# Patient Record
Sex: FEMALE | Race: WHITE
[De-identification: names, ages, dates, MRNs, and addresses within clinical notes are randomized per-mention and may not be internally consistent; named-entity substitution may affect disease eponyms.]

---

## 2017-01-20 ENCOUNTER — HOSPITAL ENCOUNTER (OUTPATIENT)
Dept: HOSPITAL 75 - CARD | Age: 52
Discharge: HOME | End: 2017-01-20
Attending: PAIN MEDICINE
Payer: MEDICARE

## 2017-01-20 VITALS — HEIGHT: 68 IN | WEIGHT: 214 LBS | BODY MASS INDEX: 32.43 KG/M2

## 2017-01-20 VITALS — SYSTOLIC BLOOD PRESSURE: 138 MMHG | DIASTOLIC BLOOD PRESSURE: 85 MMHG

## 2017-01-20 VITALS — DIASTOLIC BLOOD PRESSURE: 100 MMHG | SYSTOLIC BLOOD PRESSURE: 136 MMHG

## 2017-01-20 DIAGNOSIS — M53.3: Primary | ICD-10-CM

## 2017-01-20 DIAGNOSIS — Z79.899: ICD-10-CM

## 2017-01-20 DIAGNOSIS — M51.16: ICD-10-CM

## 2017-01-20 PROCEDURE — 27096 INJECT SACROILIAC JOINT: CPT

## 2017-01-20 NOTE — PAIN MEDICINE-PROCEDURE
Procedure


Pre-Op/Post-Op Diagnosis


Diagnosis:  sacrococcygeal disorder


Indications for Operation


Hip pain


Attending Surgeon


Sakina





Procedure


Date of Service:  Jan 20, 2017


Procedure:  Flouroscopic guided bilateral sacroiliac joint injection





PROCEDURE IN DETAIL:  After obtaining informed consent from the patient, the 

patient's chart was reviewed.  The patient was then brought to the procedure 

room and placed in the prone position.  A time out was performed.  The back was 

prepped with antiseptic solution and under fluoroscopic guidance the patient's 

sacroiliac joint on both sides was identified.  Attention was first turned to 

the right sacroiliac joint injection where 2 mL's of 1% lidocaine was used to 

anesthetize the skin and then two  22-gauge 3.5 inch spinal needles were 

inserted and advanced under flouroscopic guidance until they were in the lower 1

/3 of the right sacroiliac joint.  Next, attention was then turned to the left 

sacroiliac joint injection where 2 mL's of 1% lidocaine was used to anesthetize 

the skin and then two 22-gauge 3.5 inch spinal needles were inserted and 

advance under flouroscopic guidance until they were in the lower 1/3 of the 

sacroiliac joint on the left side.  After negative aspiration, each needle was 

injected with 40 mg of Kenalog along with 2 mL's of 0.25% marcaine.  All 

needles were then flushed with 1% lidocaine and then removed.  





Band-Aids were applied to all the sites and the patient tolerated the procedure 

well and was taken to the recovery area in stable condition.


Complications


None








MACKENZIE KWAN MD Jan 20, 2017 2:10 pm

## 2017-05-22 ENCOUNTER — HOSPITAL ENCOUNTER (OUTPATIENT)
Dept: HOSPITAL 75 - CARD | Age: 52
Discharge: HOME | End: 2017-05-22
Attending: PAIN MEDICINE
Payer: MEDICARE

## 2017-05-22 VITALS — WEIGHT: 217 LBS | HEIGHT: 68 IN | BODY MASS INDEX: 32.89 KG/M2

## 2017-05-22 VITALS — SYSTOLIC BLOOD PRESSURE: 145 MMHG | DIASTOLIC BLOOD PRESSURE: 98 MMHG

## 2017-05-22 VITALS — DIASTOLIC BLOOD PRESSURE: 100 MMHG | SYSTOLIC BLOOD PRESSURE: 156 MMHG

## 2017-05-22 DIAGNOSIS — M53.3: Primary | ICD-10-CM

## 2017-05-22 DIAGNOSIS — M51.16: ICD-10-CM

## 2017-05-22 PROCEDURE — 27096 INJECT SACROILIAC JOINT: CPT

## 2017-05-22 NOTE — PAIN MEDICINE-PROCEDURE
Procedure


Pre-Op/Post-Op Diagnosis


Diagnosis:  sacrococcygeal disorder


Indications for Operation


Hip pain


Attending Surgeon


Sakina





Procedure


Date of Service:  May 22, 2017


Procedure:  Flouroscopic guided bilateral sacroiliac joint injection





PROCEDURE IN DETAIL:  After obtaining informed consent from the patient, the 

patient's chart was reviewed.  The patient was then brought to the procedure 

room and placed in the prone position.  A time out was performed.  The back was 

prepped with antiseptic solution and under fluoroscopic guidance the patient's 

sacroiliac joint on both sides was identified.  Attention was first turned to 

the right sacroiliac joint injection where 2 mL's of 1% lidocaine was used to 

anesthetize the skin and then two  22-gauge 3.5 inch spinal needles were 

inserted and advanced under flouroscopic guidance until they were in the lower 1

/3 of the right sacroiliac joint.  Next, attention was then turned to the left 

sacroiliac joint injection where 2 mL's of 1% lidocaine was used to anesthetize 

the skin and then two 22-gauge 3.5 inch spinal needles were inserted and 

advance under flouroscopic guidance until they were in the lower 1/3 of the 

sacroiliac joint on the left side.  After negative aspiration, each needle was 

injected with 40 mg of Kenalog along with 2 mL's of 0.25% marcaine.  All 

needles were then flushed with 1% lidocaine and then removed.  





Band-Aids were applied to all the sites and the patient tolerated the procedure 

well and was taken to the recovery area in stable condition.


Complications


None











MACKENZIE KWAN MD May 22, 2017 4:05 pm

## 2018-01-25 ENCOUNTER — HOSPITAL ENCOUNTER (OUTPATIENT)
Dept: HOSPITAL 75 - CARD | Age: 53
Discharge: HOME | End: 2018-01-25
Attending: PAIN MEDICINE
Payer: MEDICARE

## 2018-01-25 VITALS — SYSTOLIC BLOOD PRESSURE: 155 MMHG | DIASTOLIC BLOOD PRESSURE: 97 MMHG

## 2018-01-25 VITALS — BODY MASS INDEX: 32.89 KG/M2 | HEIGHT: 68 IN | WEIGHT: 217 LBS

## 2018-01-25 VITALS — SYSTOLIC BLOOD PRESSURE: 143 MMHG | DIASTOLIC BLOOD PRESSURE: 86 MMHG

## 2018-01-25 DIAGNOSIS — Z79.899: ICD-10-CM

## 2018-01-25 DIAGNOSIS — M46.1: ICD-10-CM

## 2018-01-25 DIAGNOSIS — F17.210: ICD-10-CM

## 2018-01-25 DIAGNOSIS — K21.9: ICD-10-CM

## 2018-01-25 DIAGNOSIS — Z88.2: ICD-10-CM

## 2018-01-25 DIAGNOSIS — Z88.8: ICD-10-CM

## 2018-01-25 DIAGNOSIS — F32.9: ICD-10-CM

## 2018-01-25 DIAGNOSIS — G89.4: ICD-10-CM

## 2018-01-25 DIAGNOSIS — M54.16: Primary | ICD-10-CM

## 2018-01-25 PROCEDURE — 62323 NJX INTERLAMINAR LMBR/SAC: CPT

## 2018-03-22 ENCOUNTER — HOSPITAL ENCOUNTER (OUTPATIENT)
Dept: HOSPITAL 75 - CARD | Age: 53
End: 2018-03-22
Attending: PAIN MEDICINE
Payer: MEDICARE

## 2018-03-22 VITALS — DIASTOLIC BLOOD PRESSURE: 92 MMHG | SYSTOLIC BLOOD PRESSURE: 143 MMHG

## 2018-03-22 VITALS — WEIGHT: 217 LBS | HEIGHT: 68 IN | BODY MASS INDEX: 32.89 KG/M2

## 2018-03-22 VITALS — SYSTOLIC BLOOD PRESSURE: 139 MMHG | DIASTOLIC BLOOD PRESSURE: 90 MMHG

## 2018-03-22 DIAGNOSIS — M54.16: ICD-10-CM

## 2018-03-22 DIAGNOSIS — Z79.899: ICD-10-CM

## 2018-03-22 DIAGNOSIS — G89.4: ICD-10-CM

## 2018-03-22 DIAGNOSIS — M46.1: Primary | ICD-10-CM

## 2018-03-22 PROCEDURE — 27096 INJECT SACROILIAC JOINT: CPT

## 2018-07-12 ENCOUNTER — HOSPITAL ENCOUNTER (OUTPATIENT)
Dept: HOSPITAL 75 - CARD | Age: 53
End: 2018-07-12
Attending: PAIN MEDICINE
Payer: MEDICARE

## 2018-07-12 VITALS — DIASTOLIC BLOOD PRESSURE: 85 MMHG | SYSTOLIC BLOOD PRESSURE: 138 MMHG

## 2018-07-12 VITALS — BODY MASS INDEX: 35.31 KG/M2 | HEIGHT: 68 IN | WEIGHT: 233 LBS

## 2018-07-12 VITALS — SYSTOLIC BLOOD PRESSURE: 144 MMHG | DIASTOLIC BLOOD PRESSURE: 89 MMHG

## 2018-07-12 DIAGNOSIS — M54.16: Primary | ICD-10-CM

## 2018-07-12 PROCEDURE — 62290 NJX PX DISCOGRAPHY LUMBAR: CPT

## 2018-07-12 NOTE — OPERATIVE REPORT
DATE OF SERVICE:  07/12/2018



DIAGNOSIS:

Lumbar radiculopathy.



PROCEDURE:

Fluoroscopic guided interlaminar, epidural steroid injection.

 

PROCEDURE IN DETAIL:

After obtaining informed consent from the patient, the patient's chart was

reviewed.  The patient was then brought to the procedure room and placed in the

prone position.  A timeout was performed.  The back was prepped with antiseptic

solution and under fluoro guidance, the patient's lumbar spine was identified at

the level of L5-S1.  The L5-S1 vertebra was identified with fluoro guidance and

approximately 2 mL of 1.5% lidocaine solution was used to anesthetize the skin

directly down to the pedicle of the L5-S1 and under fluoroscopic guidance, the

tract was anesthetized up to the interlaminar space and the ligamentum flavum. 

This needle was withdrawn.  Then, a 20-gauge 3.5 inch Tuohy needle was then

directed following the same tract that was anesthetized with the spinal needle. 

Using loss of resistance, the epidural space was identified and then the syringe

was switched for contrast solution which was injected, approximately 1 mL. 

After secondary confirmation of epidural access, another syringe was placed and

80 mg of Depo-Medrol was injected.  The Tuohy needle was then flushed out with

approximately 2 mL of the normal saline used from the loss of resistance

syringe.

 

Band-Aids were applied to all the procedure sites.  The patient tolerated the

procedure well and was taken to the recovery room in stable condition.





Job ID: [id number]

DocumentID: [id number]

Dictated Date:  07/12/2018 15:42:35

Transcription Date: 07/12/2018 22:34:03

Dictated By: KELLY ROBISON DO

## 2019-04-23 ENCOUNTER — HOSPITAL ENCOUNTER (OUTPATIENT)
Dept: HOSPITAL 75 - RAD | Age: 54
End: 2019-04-23
Attending: FAMILY MEDICINE
Payer: MEDICARE

## 2019-04-23 DIAGNOSIS — R07.81: ICD-10-CM

## 2019-04-23 DIAGNOSIS — S49.92XA: Primary | ICD-10-CM
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
## 2019-04-23 PROCEDURE — 73000 X-RAY EXAM OF COLLAR BONE: CPT

## 2019-04-23 PROCEDURE — 71045 X-RAY EXAM CHEST 1 VIEW: CPT

## 2019-04-23 NOTE — DIAGNOSTIC IMAGING REPORT
PA chest at 908 hours.



INDICATION: Fell.



FINDINGS: The heart size is within normal limits and stable when

compared to 06/15/2016. The lungs are clear. There is no sign of

a contusion or pneumothorax. There is no evidence for pneumonia

or pleural effusion. Mediastinum is not widened. The osseous

structures are intact.



IMPRESSION: There is no evidence for an acute cardiopulmonary

abnormality.



Dictated by: 



  Dictated on workstation # [id number]

## 2019-04-23 NOTE — DIAGNOSTIC IMAGING REPORT
Left clavicle at 909 hours.



INDICATION: Injury, clavicle pain.



2 views were obtained.



FINDINGS: There is no fracture, dislocation or acute bony

abnormality evident. The acromioclavicular joint is fairly

well-maintained and seem similar to the prior chest exam of

06/15/2016. The soft tissues are unremarkable.



IMPRESSION: There is no evidence for an acute bony abnormality.



Dictated by: 



  Dictated on workstation # [id number]

## 2020-08-11 ENCOUNTER — HOSPITAL ENCOUNTER (OUTPATIENT)
Dept: HOSPITAL 75 - RAD | Age: 55
End: 2020-08-11
Attending: FAMILY MEDICINE
Payer: MEDICARE

## 2020-08-11 DIAGNOSIS — Z12.31: Primary | ICD-10-CM

## 2020-08-11 PROCEDURE — 77067 SCR MAMMO BI INCL CAD: CPT

## 2020-08-11 PROCEDURE — 77063 BREAST TOMOSYNTHESIS BI: CPT

## 2020-08-11 NOTE — DIAGNOSTIC IMAGING REPORT
INDICATION: 

Routine screening.



COMPARISON: 

No prior mammograms are available for comparison.



TECHNIQUE: 

2D and 3D bilateral screening mammography was performed with CAD.



FINDINGS:

Both breasts are primarily involutional. No spiculated mass or

malignant appearing microcalcifications are identified. The

axillae are unremarkable.



IMPRESSION: 

No mammographic features suspicious for malignancy are

identified.



ACR BI-RADS Category 1: Negative.

Result letter will be mailed to the patient.

Note:  At least 10% of breast cancer is not imaged by

mammography.



Dictated by: 



  Dictated on workstation # [id number]

## 2020-08-17 ENCOUNTER — HOSPITAL ENCOUNTER (OUTPATIENT)
Dept: HOSPITAL 75 - RAD | Age: 55
End: 2020-08-17
Attending: FAMILY MEDICINE
Payer: MEDICARE

## 2020-08-17 DIAGNOSIS — M47.814: Primary | ICD-10-CM

## 2020-08-17 DIAGNOSIS — R07.9: ICD-10-CM

## 2020-08-17 DIAGNOSIS — R06.00: ICD-10-CM

## 2020-08-17 DIAGNOSIS — M25.512: ICD-10-CM

## 2020-08-17 PROCEDURE — 72072 X-RAY EXAM THORAC SPINE 3VWS: CPT

## 2020-08-17 PROCEDURE — 71046 X-RAY EXAM CHEST 2 VIEWS: CPT

## 2020-08-17 PROCEDURE — 73030 X-RAY EXAM OF SHOULDER: CPT

## 2020-08-17 NOTE — DIAGNOSTIC IMAGING REPORT
Indication: Left shoulder pain.



Time of exam: 1:02 PM



3 views of the left shoulder were obtained.



Glenohumeral and acromioclavicular alignment are normal.

Glenohumeral space is normal. No fracture or dislocation is seen.



IMPRESSION: No acute bony abnormality is detected.



Dictated by: 



  Dictated on workstation # [id number]

## 2020-08-17 NOTE — DIAGNOSTIC IMAGING REPORT
INDICATION: Dyspnea and shoulder pain. Patient also has back

pain.



TIME OF EXAM:  1:06 PM



Frontal and lateral views of the thoracic spine were obtained.

Curvature and alignment of the thoracic spine is normal.

Vertebral body heights are maintained. No acute compression

fracture is seen. There is generalized degenerative disc disease

with variable disc space narrowing. The pedicles and paraspinous

line are intact.



IMPRESSION: Thoracic spondylosis. No acute bony abnormality is

detected.



Dictated by: 



  Dictated on workstation # [id number]

## 2020-08-17 NOTE — DIAGNOSTIC IMAGING REPORT
Indication: Shoulder pain and dyspnea.



Time of exam: 12:59 PM



Comparison is made with prior chest from 04/23/2019.



The heart size is normal. The pulmonary vascularity is

unremarkable. The lungs are clear. No infiltrate, effusion or

pneumothorax is detected.



Impression: No acute cardiopulmonary process is detected.



Dictated by: 



  Dictated on workstation # [id number]

## 2020-09-15 ENCOUNTER — HOSPITAL ENCOUNTER (OUTPATIENT)
Dept: HOSPITAL 75 - RAD | Age: 55
End: 2020-09-15
Attending: ORTHOPAEDIC SURGERY
Payer: MEDICARE

## 2020-09-15 DIAGNOSIS — M48.02: Primary | ICD-10-CM

## 2020-09-15 DIAGNOSIS — M48.04: ICD-10-CM

## 2020-09-15 DIAGNOSIS — M47.22: ICD-10-CM

## 2020-09-15 DIAGNOSIS — M50.121: ICD-10-CM

## 2020-09-15 PROCEDURE — 72141 MRI NECK SPINE W/O DYE: CPT

## 2020-09-15 NOTE — DIAGNOSTIC IMAGING REPORT
PROCEDURE: MR imaging cervical spine without contrast.



TECHNIQUE: Multiplanar, multisequence MR imaging of the cervical

spine was performed without contrast.



INDICATION: Left arm pain and numbness.



COMPARISON: No prior studies are available for comparison.



FINDINGS: There is straightening of the normal cervical lordotic

curvature. Alignment is normal. The marrow signal intensity is

unremarkable. No geographic marrow lesion is seen. There is

generalized degenerative disc disease with variable disc space

narrowing and desiccation. The cervical cord shows normal

homogeneous signal intensity and normal morphology.

Craniocervical junction is unremarkable.



C2-C3: Central canal and neural foramina appear widely patent.



C3-C4: Central canal and neural foramina appear widely patent.



C4-C5: Disc/osteophyte complex indents the ventral thecal sac

producing mild-to-moderate central canal narrowing. There is also

moderate left and mild right neural foraminal stenosis due to

uncovertebral joint degenerative change.



C5-C6: Broad-based disc/osteophyte complex indents the ventral

thecal sac and produces mild-to-moderate narrowing of the canal.

There is also significant narrowing of the neural foramina

bilaterally due to uncovertebral joint degenerative change.



C6-C7: Broad-based disc/osteophyte complex indents the ventral

thecal sac producing some mild-to-moderate central canal

narrowing. Neural foramina appear patent.



C7-T1: Broad-based disc/osteophyte complex flattens the ventral

thecal sac and does produce mild-to-moderate narrowing of the

central canal. There is mild neural foraminal narrowing

bilaterally.



Paraspinous tissues are unremarkable.



IMPRESSION: Multilevel cervical spondylosis with multilevel

central canal or neural foraminal narrowing described level by

level above.



Dictated by: 



  Dictated on workstation # [id number]

## 2021-09-03 NOTE — XMS REPORT
CARDIOVASCULAR MEDICINE, NEW PATIENT    COVID-19 Screening:    • Does the patient OR patient’s household members have any of the following symptoms?  o Temperature: Fever ?100.0°F or ?37.8°C?  No  o Respiratory symptoms: New or worsening cough, shortness of breath, difficulty breathing, or sore throat? No  o GI symptoms: New onset of nausea, vomiting or diarrhea?  No  o Miscellaneous: New onset of loss of taste or smell, chills, repeated shaking with chills, muscle pain, headache, congestion or runny nose?  No  • Has the patient or a household member tested positive for COVID-19 in the last 14 days?  No  • Has the patient or a household member been tested for COVID-19 and are waiting for the results?  No    REASON FOR CONSULTATION:    I am seeing this patient at the request of Dr. Lance Montgomery for orthostatic hypotension.    CHIEF COMPLAINT:    Orthostatic hypotension    HISTORY OF PRESENT ILLNESS:    Giuseppe Segura is a healthy 19 y/o male, with asthma and eczema, who presents to establish care with Cardiology.     He's had a variety of symptoms, which have been present for ~4 months.    Symptoms are primarily brought on during exertion. He frequently noticing a racing heart (but hasn't actually measured HR, at these times). There's also been occasional dizziness, but never with actual LOC.    He will often notice dizziness/lightheadedness, upon going from sitting to standing. Sometimes, it's difficult to distinguish symptoms from those related to asthma. He does have an occasional chest tightness, likely attributable to asthma.    Sometimes, he's noted some low blood pressures. He wonders about the possibility of dehydration.    Symptoms were potentially at their worst over Labor Day, when he was playing basketball in some very hot weather.    He'd been completing ~1 hour of exercise, 5 times per week, with occasional pick-up games of basketball.    He presents today with his mother, Jenn, who helps to clarify  Continuity of Care Document

 Created on: 2016



PALAK DYSON

External Reference #: [id number]

: 1965

Sex: Female



Demographics







 Address  [address] [place]  [address]

 

 Home Phone  [phone]

 

 Preferred Language  English

 

 Marital Status  Unknown

 

 Tenriism Affiliation  Unknown

 

 Race  Unknown

 

 Ethnic Group  Unknown





Author







 Author  [facility]

 

 Organization  [facility]

 

 Address  Unknown

 

 Phone  Unavailable







Support







 Name  Relationship  Address  Phone

 

 NO, LOCAL PHYSICIAN  Caregiver  Unknown  Unavailable

 

 MACKENZIE KWAN MD  Caregiver  [address] [facility] [address]

[place]  [address] [phone]

 

 KELLY SON MD  Caregiver  [address] [place]  [address]  [phone]

 

 DAVID DYSON  Next Of Kin  [address] [place]  [address] [phone]







Care Team Providers







 Care Team Member Name  Role  Phone

 

 NO, LOCAL PHYSICIAN  PCP  Unavailable







Insurance Providers







 Payer Name  Policy Number  Subscriber Name  Relationship

 

 [employer]  [id number]  Palak Dyson  18 Self / Same As Patient

 

 [facility]  [id number]  Palak Dyson  18 Self / Same As Patient







Advance Directives







 Directive  Response  Recorded Date/Time

 

 Advance Directives  No  16 8:52am

 

 Health Care Power of   No  16 8:52am

 

 Organ Donor  No  16 8:52am

 

 Resuscitation Status  Full Code  16 8:52am







Problems

Active Problems







 Medical Problem  Onset Date  Status

 

 Anxiety  Unknown  Acute

 

 Narcotic abuse  Unknown  Acute

 

 Nausea & vomiting  Unknown  Acute

 

 Nausea & vomiting  Unknown  Acute

 

 Withdrawal symptoms, drug or narcotic  Unknown  Acute







Medications

Current Home Medications







 Medication  Dose  Units  Route  Directions  Days/Qty  Instructions  Start Date

 

 Duloxetine Hcl 30 Mg  60  Mg  Oral  Twice A Day        14

 

 Alprazolam 1 Mg  1  Mg  Oral  Every 6 Hours        14

 

 Cyclobenzaprine Hcl 10 Mg  10  Mg  Oral  Three Times A Day        14

 

 Multivitamin 1 Each  1  Each  Oral  Daily        06/15/16

 

 Oxycodone Hcl/Acetaminophen 1 Each  1  Each  Oral  Every 4HRS  60     16





Past Home Medications







 Medication  Directions  Ordered  Status

 

 Trimethoprim/Sulfamethoxazole 1 Ea Tablet, 1 Ea Oral  Twice A Day  12  
Discontinued

 

 Tramadol Hcl 50 Mg Tablet, 50 Mg Oral     14  Discontinued

 

 Meloxicam (Mobic) 15 Mg Tablet, 15 Mg Oral  Daily  14  Discontinued

 

 Furosemide (Lasix) 40 Mg Tablet, 1 Each Oral  Daily  14  Discontinued

 

 Potassium Chloride (Micro K) 10 Meq Capsule.sa, 10 Meq Oral  Daily With Food  
14  Discontinued

 

 Morphine Sulfate 15 Mg Tab, 45 Each Oral  Give Every 12 Hrs On Schedule    Discontinued

 

 Ondansetron 8 Mg Tab.rapdis, 8 Mg Oral  Every 6 Hours as needed for Nausea/
Vomiting  14  Discontinued

 

 Hydrocodone/Ibuprofen 1 Each Tablet, 1 Each Oral  Four Times Daily  06/15/16  
Discontinued







Social History







 Social History Problem  Response  Recorded Date/Time

 

 Alcohol Use  Denies Use  2016 8:50am

 

 Recreational Drug Use  No  2016 8:50am

 

 Recent Foreign Travel  No  2016 8:22am

 

 Sexually Transmitted Disease  No  2016 8:50am

 

 HIV/AIDS  No  2016 8:50am

 

 Sexually Transmitted Disease  No  2016 8:50am







Hospital Discharge Instructions

No hospital discharge instructions.



Plan of Care







 Discharge Date  16 9:12am

 

 Instructions/Education Provided  DR. KWAN-POST EPIDURAL INST

 

 Prescriptions  See Medication Section







Functional Status

No functional status results.



Allergies, Adverse Reactions, Alerts







 Allergen  Type  Severity  Reaction  Status  Last Updated

 

 buspirone ([id number])  Allergy  Unknown     Active  06/15/16







Immunizations

No immunization records.



Vital Signs

Acute Vital Signs





 Vital  Response  Date/Time

 

 Temperature (Fahrenheit)  98.5 degrees F (97.6 - 99.5)  2016 8:52am

 

 Temperature (Calculated Celsius)  36.99162 degrees C (36.4 - 37.5)  2016 
8:52am

 

 Temperature Source  Tympanic  2016 8:52am

 

 Pulse Rate (adult)  99 bpm (60 - 90)  2016 9:11am

 

 Respiratory Rate  14 bpm (12 - 24)  2016 9:11am

 

 O2 Sat by Pulse Oximetry  96 % (88 - 100)  2016 9:11am

 

 Blood Pressure  130/80 mm Hg  2016 9:11am

 

    Blood Pressure Mean  102 mm Hg  2016 8:52am

 

 Pain      

 

    Numeric Pain Scale  3  2016 9:11am

 

 Height (Feet)  5 feet  2016 8:52am

 

 Height (Inches)  8.00 inches  2016 8:52am

 

 Height (Calculated Centimeters)  172.817140 cm  2016 8:52am

 

 Weight (Pounds)  191 pounds  2016 8:52am

 

 Weight (Ounces)  0.0 oz  2016 8:52am

 

 Weight (Calculated Grams)  15692.14 gm  2016 8:52am

 

 Weight (Calculated Kilograms)  86.392880 kilograms  2016 8:52am

 

 Calculated BMI  29.0  2016 8:52am







Results

No known relevant diagnostic tests, laboratory data and/or discharge summary.



Procedures

No known history of procedures.



Encounters







 Encounter  Location  Arrival/Admit Date  Discharge/Depart Date  Attending 
Provider

 

 Departed Clinic  [facility]  16 8:29am  16 9:
12am  MACKENZIE KWAN MD

 

 Registered Recurring  [facility]  16 12:57pm     
KELLY SON MD his HPI.    He's doing an online education, is studying business.    CARDIAC RISK FACTORS:    Tobacco: lifetime non-smoker  Hypertension: no  Hyperlipidemia: no  Diabetes: no  Family History:    Father w/ atrial fibrillation, had been seen by Dr. Hector rehman w/ CABG    CARDIAC DIAGNOSTICS:    EKG, 2021:  Sinus rhythm   WITHIN NORMAL LIMITS        Outpatient Medications Marked as Taking for the 21 encounter (Office Visit) with Chevy Grimaldo MD   Medication Sig Dispense Refill   • Asmanex, 60 Metered Doses, 220 MCG/INH inhaler INHALE 2 PUFFS INTO THE LUNGS TWICE DAILY 1 each 3   • loratadine (CLARITIN) 10 MG tablet Take 1 tablet by mouth daily. 90 tablet 3   • albuterol 108 (90 Base) MCG/ACT inhaler Inhale 2 puffs into the lungs every 4 hours as needed for Shortness of Breath or Wheezing. 1 Inhaler 1   • fluticasone (FLONASE) 50 MCG/ACT nasal spray Spray 2 sprays in each nostril daily. SHAKE LIQUID 3 Bottle 3   • EPINEPHrine 0.3 MG/0.3ML auto-injector Inject 0.3 mLs into the muscle 1 time as needed for Anaphylaxis. Need follow-up appt with PCP. 1 each 0     ALLERGIES:   Allergen Reactions   • Eggs Or Egg-Derived Products   (Food Or Med) HIVES   • Environmental [Other]      pollen, some foods--hives, ashtma attack?   • Milk   (Food) HIVES   • Mold   (Environmental)    • Peanuts [Peanut - Dietary Use Only] HIVES   • Pecan   (Food)      Past Medical History:   Diagnosis Date   • Candidiasis of mouth    • Eczema     hands and behind knees   • Other and unspecified diseases of upper respiratory tract     upper airway secretions, apneic spells as      Past Surgical History:   Procedure Laterality Date   • Circumcision clamp w reg block penile ring      Circumcision, with clamp, .  Dr. Dias     Family History   Problem Relation Age of Onset   • Thyroid Mother         hypothyroid   • Heart Father         irregular heart beat   • Osteoporosis Maternal Grandmother    • Scoliosis  Maternal Grandmother      Social History     Socioeconomic History   • Marital status: Single     Spouse name: Not on file   • Number of children: Not on file   • Years of education: Not on file   • Highest education level: Not on file   Occupational History   • Occupation: student   Tobacco Use   • Smoking status: Never Smoker   • Smokeless tobacco: Never Used   Vaping Use   • Vaping Use: never used   Substance and Sexual Activity   • Alcohol use: No   • Drug use: No   • Sexual activity: Never   Other Topics Concern   • Not on file   Social History Narrative   • Not on file     Social Determinants of Health     Financial Resource Strain:    • Social Determinants: Financial Resource Strain:    Food Insecurity:    • Social Determinants: Food Insecurity:    Transportation Needs:    • Lack of Transportation (Medical):    • Lack of Transportation (Non-Medical):    Physical Activity:    • Days of Exercise per Week:    • Minutes of Exercise per Session:    Stress:    • Social Determinants: Stress:    Social Connections:    • Social Determinants: Social Connections:    Intimate Partner Violence:    • Social Determinants: Intimate Partner Violence Past Fear:    • Social Determinants: Intimate Partner Violence Current Fear:      REVIEW OF SYSTEMS:    A 12-point review of systems was conducted and all items were found to be negative, other than those stated as positive per the HPI.    PHYSICAL EXAMINATION:    VITALS:   Vitals:    09/09/21 1034   BP: 108/62   BP Location: RUE - Right upper extremity   Patient Position: Sitting   Cuff Size: Regular   Pulse: 71   SpO2: 97%   Height: 5' 10\" (1.778 m)       GENERAL: Young  male, in no acute distress.  EYES: Normal in appearance, without conjunctival injection. Pupils equal, round and reactive to light.  ENT: Mucous membranes moist, without oral pallor.  CARDIOVASCULAR: Regular rate and rhythm, normal S1 and S2; no murmurs, rubs or gallops. No carotid bruit. No jugular  venous distention.  RESPIRATORY: Normal respiratory effort; lungs to auscultation bilaterally.  GASTROINTESTINAL: Soft, non-tender, non-distended. No palpable masses.  EXTREMITIES: WWP, no lower extremity edema.  SKIN: Warm, dry and intact; no rashes or lesions.  MSK: Hands/digits without clubbing or cyanosis.  NEUROLOGIC: Alert and oriented x3.  PSYCHIATRIC: Normal mood and affect.    LABS:    Recent Labs   Lab 08/24/21  1552 08/11/21  1602   HGB  --  15.1   BUN 16 14   Creatinine 1.27* 1.35*   C-Reactive Protein  --  <0.3   Potassium 4.4 4.3   GOT/AST  --  27   GPT  --  38   TSH  --  0.807       ASSESSMENT/PLAN:    Giuseppe Segura is a healthy 21 y/o male, with asthma and eczema, who presents to establish care with Cardiology.     #. Orthostatic hypotension  #. Dizziness/lightheadedness  #. Palpitations  #. SOB/ESTES  #. Chest tightness  #. Asthma  #. Eczema    We discussed this constellation of symptoms, and will proceed with some further evaluation.    To include:    #. Proceed with a resting Echo.  #. Proceed with a 48-hour Holter monitor.  #. Proceed with a tilt table, to be completed at [facility], with Dr. Appiah.  #. Note recent TSH was WNL.    He spoke about potential POTS syndrome, and I've placed a referral to Dr. Woods, Neurology.    Additionally, he's advised to avoid dehydration, which may be a precipitating/instigating factor.    All of his and his mother's questions were answered.    We'll contact him with results of the aforementioned testing, as results become available.    Planned follow-up with me in ~3 months.    Chevy Grimaldo MD, FACC  [facility]

## 2023-04-22 ENCOUNTER — HOSPITAL ENCOUNTER (EMERGENCY)
Dept: HOSPITAL 75 - ER | Age: 58
Discharge: HOME | End: 2023-04-22
Payer: MEDICARE

## 2023-04-22 VITALS — HEIGHT: 67.72 IN | BODY MASS INDEX: 38.2 KG/M2 | WEIGHT: 249.12 LBS

## 2023-04-22 VITALS — SYSTOLIC BLOOD PRESSURE: 145 MMHG | DIASTOLIC BLOOD PRESSURE: 90 MMHG

## 2023-04-22 DIAGNOSIS — Z23: ICD-10-CM
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
## 2023-04-22 DIAGNOSIS — T15.01XA: Primary | ICD-10-CM

## 2023-04-22 PROCEDURE — 90715 TDAP VACCINE 7 YRS/> IM: CPT

## 2023-04-22 PROCEDURE — 99284 EMERGENCY DEPT VISIT MOD MDM: CPT

## 2023-04-22 NOTE — ED EENT
History of Present Illness


General


Chief Complaint:  Eye Problems


Stated Complaint:  SOMETHING IN RIGHT EYE


Source:  patient


Exam Limitations:  no limitations





History of Present Illness


Date Seen by Provider:  Apr 22, 2023


Time Seen by Provider:  13:13


Timing/Duration:  abrupt


Severity:  mild


Prearrival Treatment:  no prearrival treatment


Associated Symptoms:  denies symptoms





Allergies and Home Medications


Allergies


Coded Allergies:  


     buspirone (Verified  Allergy, Unknown, 6/15/16)





Patient Home Medication List


Alprazolam (Xanax) 1 Mg Tablet, 1 MG PO Q6H, (Reported)


   Entered as Reported by: ELISEO ROSE on 4/6/14 1135


Cyclobenzaprine Hcl (Flexeril Tablet) 10 Mg Tablet, 10 MG PO TID, (Reported)


   Entered as Reported by: ELISEO ROSE on 4/6/14 1135


Duloxetine Hcl (Cymbalta) 30 Mg Capsule.dr, 60 MG PO BID, (Reported)


   Entered as Reported by: ELISEO ROSE on 4/6/14 1131


Multivitamin (Daily Multiple Vitamin) 1 Each Tablet, 1 EACH PO DAILY, (Reported)


   Entered as Reported by: CURLY KILPATRICK on 6/15/16 1004


Oxycodone HCl/Acetaminophen (Percocet 5-325 mg Tablet) 1 Each Tablet, 1 EACH PO 

Q4H


   Prescribed by: KELLY SON on 6/29/16 2042





Past Medical-Social-Family Hx


Past Medical History


Reproductive Disorders:  No


Female Reproductive Disorders:  Denies


GYN History:  Tubal Ligation


Sexually Transmitted Disease:  No


HIV/AIDS:  No


Chronic Constipation


Degenerate Disk Disease, Arthritis, Chronic Back Pain


Loss of Vision:  Bilateral


Hearing Impairment:  Denies


Anxiety, Depression


Adverse Reaction/Blood Tranf:  No (N/A)





Family Medical History





Cardiovascular disease


  19 FATHER


DEPRESSON


  19 MOTHER


Diabetes mellitus


  19 MOTHER


HUNTERS DISEASE


  19 MOTHER


Hypertension


LUNG CANCER


  19 MOTHER


MRSA


  G8 SISTER


Myocardial infarction


  19 FATHER


JUDI MAINOR


  G8 SISTER





Physical Exam


Vital Signs





Vital Signs - First Documented








 4/22/23





 13:03


 


Temp 36.4


 


Pulse 85


 


Resp 20


 


B/P (MAP) 145/90 (108)


 


Pulse Ox 97


 


O2 Delivery Room Air








Height, Weight, BMI


Height: 5'8.00"


Weight: 233lbs. 0.0oz. 105.642519uo; 35.4 BMI


Method:Stated





Procedures/Interventions


Eye :  


   Location:  right eye


   Eye FB Removal:  removal w/Ophtho-Umm





   Suture Size:  5-0





Progress/Results/Core Measures


Results/Orders


My Orders





Orders - NEREIDA ULLOA MD


Fluorescein Strips (Fluor-I-Strips) (4/22/23 13:09)


Tetracaine  0.5% Ophth Sol Sdv (Tetracai (4/22/23 13:09)


Balanced Salt Irrigation Soln (Bss Irrig (4/22/23 13:09)


Tetracaine  0.5% Ophth Sol Sdv (Tetracai (4/22/23 13:30)


Fluorescein Strips (Fluor-I-Strips) (4/22/23 13:30)


Balanced Salt Irrigation Soln (Bss Irrig (4/22/23 13:30)





Medications Given in ED





Current Medications








 Medications  Dose


 Ordered  Sig/Jose Alejandro


 Route  Start Time


 Stop Time Status Last Admin


Dose Admin


 


 Balanced Salt


 Solution  15 ml  ONCE  ONCE


 IR  4/22/23 13:30


 4/22/23 13:31 DC 4/22/23 13:35


15 ML


 


 Fluorescein Sodium  1 mg  ONCE  ONCE


 OU  4/22/23 13:30


 4/22/23 13:31 DC 4/22/23 13:35


1 MG


 


 Tetracaine HCl  4 ml  ONCE  ONCE


 OU  4/22/23 13:30


 4/22/23 13:31 DC 4/22/23 13:35


4 ML








Vital Signs/I&O











 4/22/23





 13:03


 


Temp 36.4


 


Pulse 85


 


Resp 20


 


B/P (MAP) 145/90 (108)


 


Pulse Ox 97


 


O2 Delivery Room Air











Departure


Impression





   Primary Impression:  


   Foreign body of cornea


   Qualified Codes:  T15.01XA - Foreign body in cornea, right eye, initial 

   encounter


Disposition:  01 HOME, SELF-CARE


Condition:  Stable





Departure-Patient Inst.


Decision time for Depature:  13:58


Referrals:  


HOME BOWDEN OD, CHAD C MD (PCP/Family)


Primary Care Physician


Patient Instructions:  Foreign Body in Eye ED, Corneal Abrasion (DC)





Add. Discharge Instructions:  


Use the eyedrops (antibiotics - Viggamox) 1 drop in the right eye 3 times a day 

for 1 week.





I have given you a prescription for hydrocodone you can take 1 every 6 hours as 

needed for pain.  You can take 1 extra strength Tylenol with this every 6 hours 

as well.





You will need to call the eye doctor on Monday morning for a follow-up 

appointment.  You need to be seen on either Monday or Tuesday.





If you develop eye swelling with increased pain, redness please return to the 

emergency department for reevaluation.


Scripts


Hydrocodone/Acetaminophen (Hydrocodone-Acetamin 5-325 mg) 5 Mg-325 Mg Tablet


1 TAB PO Q6H PRN for PAIN-MODERATE (5-7), #12 TAB


   Prov: NEREIDA ULLOA MD         4/22/23





Images


Eye











1 - Dye uptake (fluorescein), Foriegn Body


2 - Dye uptake (fluorescein), Foriegn Body














NEREIDA ULLOA MD         Apr 22, 2023 13:19